# Patient Record
Sex: MALE | Race: WHITE | Employment: FULL TIME | URBAN - METROPOLITAN AREA
[De-identification: names, ages, dates, MRNs, and addresses within clinical notes are randomized per-mention and may not be internally consistent; named-entity substitution may affect disease eponyms.]

---

## 2018-07-31 ENCOUNTER — OFFICE VISIT - RIVER FALLS (OUTPATIENT)
Dept: FAMILY MEDICINE | Facility: CLINIC | Age: 23
End: 2018-07-31

## 2022-02-12 VITALS
DIASTOLIC BLOOD PRESSURE: 78 MMHG | SYSTOLIC BLOOD PRESSURE: 130 MMHG | TEMPERATURE: 98.4 F | HEART RATE: 68 BPM | WEIGHT: 172 LBS

## 2022-02-16 NOTE — PROGRESS NOTES
Patient:   ABELARDO RAMOS            MRN: [id number]            FIN: [id number]               Age:   22 years     Sex:  Male     :  1995   Associated Diagnoses:   Contusion of head   Author:   Jose Miguel Anderson MD      Visit Information      Date of Service: 2018 06:30 pm  Performing Location: [facility]  Encounter#: [id number]      Chief Complaint   2018 6:34 PM CDT    Pt c/o head injury today at work ([employer]) bent down to pick something up and hit forehead on way down around 1130. [id number] felt dizziness, lightheaded, nausea. Vision is foggy now. Here with GF. Took 600mg ibuprofen at 1730.        History of Present Illness   Patient is here concerns of a head injury.  He bent down and bumped his head on a box 1130 today.  Starting a few hours later he felt dizzy lightheaded some nausea.  He took ibuprofen.  No other complaints.  He was able to do his job without a problem.  No history of head injuries in the past.  No significant headache.  No bruising.         Review of Systems   Constitutional   Eye:  Negative except as documented in history of present illness.    Ear/Nose/Mouth/Throat:  Negative.    Respiratory:  Negative.    Cardiovascular:  Negative.    Gastrointestinal:  Negative except as documented in history of present illness.    Genitourinary:  Negative.    Musculoskeletal:  Negative except as documented in history of present illness.    Integumentary:  Negative.    Neurologic:  Negative except as documented in history of present illness.       Health Status   Allergies:    Allergic Reactions (Selected)  No Known Medication Allergies   Medications:    Medications          No Known Home Medications recorded for this encounter        Physical Examination   Vital Signs   2018 6:34 PM CDT Temperature Tympanic 98.4 DegF    Peripheral Pulse Rate 68 bpm    Pulse Site Radial artery    HR Method Manual    Systolic Blood Pressure 130 mmHg    Diastolic Blood Pressure 78  mmHg    Mean Arterial Pressure 95 mmHg    BP Site Right arm    BP Method Manual      Measurements from flowsheet : Measurements   7/31/2018 6:34 PM CDT    Weight Measured - Standard                172 lb     General:  Alert and oriented, No acute distress.    Eye:  Pupils are equal, round and reactive to light, Extraocular movements are intact, Normal conjunctiva, Vision unchanged.    HENT:  Normocephalic, Tympanic membranes are clear.    Neck:  Supple, Non-tender.    Respiratory:  Respirations are non-labored.    Cardiovascular:  Normal rate, Regular rhythm.    Gastrointestinal:  Soft.    Neurologic:  Alert, Oriented, No focal deficits, Cranial Nerves II-XII are grossly intact.       Impression and Plan   Diagnosis     Contusion of head ([id number] S00.93XA).     Plan:  Patient with incidental bump to head with no hard findings on exam perhaps a little tenderness over the top of his forehead but no swelling today.  Head injury was reviewed concussion protocol was reviewed but again highly unlikely that his injury contributed to his symptoms today.  Suggested symptomatic care at home and follow-up in a week to be sent back to normal call for any other further problems.  End  .    Patient Instructions:       Counseled: Patient, Regarding treatment, Regarding diagnosis, Activity.